# Patient Record
Sex: MALE | Race: WHITE | NOT HISPANIC OR LATINO | Employment: UNEMPLOYED | URBAN - METROPOLITAN AREA
[De-identification: names, ages, dates, MRNs, and addresses within clinical notes are randomized per-mention and may not be internally consistent; named-entity substitution may affect disease eponyms.]

---

## 2020-01-07 ENCOUNTER — TELEPHONE (OUTPATIENT)
Dept: PHARMACY | Facility: CLINIC | Age: 30
End: 2020-01-07

## 2020-01-07 NOTE — TELEPHONE ENCOUNTER
No answer/VM to inform patient that [facility] received prescription for Humira CF and prior authorization is required.  [facility] will be back in touch once insurance determination is received.

## 2020-01-15 NOTE — TELEPHONE ENCOUNTER
Called patient to let him know PA was denied because his insurance company requires negative HBV and TB tests within last 30 days and his last labs were 5/16/2019. Patient verbalized understanding and will call provider to schedule appt. Messaged provider as well to order repeat labs.     Francoise Weaver, Pharm.D.  Pharmacy Resident, PGY-1   [facility]

## 2020-02-27 PROBLEM — K50.119 CROHN'S DISEASE OF COLON WITH COMPLICATION: Status: ACTIVE | Noted: 2020-02-27

## 2020-03-11 NOTE — TELEPHONE ENCOUNTER
DOCUMENTATION ONLY:     Prior Authorization for Humira CF APPROVED from 3/4/20 to 3/4/21.      Case ID# [id number]    Co-Pay: $0.00    Patient Assistance IS NOT required.     Forward to clinical pharmacist for consult & shipment.      YOLANDA

## 2020-03-11 NOTE — TELEPHONE ENCOUNTER
Call attempt regarding Humira consult and shipment from [facility]. Unable to LVM. Refill too soon until 3/29. Will continue to try to reach patient to offer [facility] services.

## 2020-03-16 ENCOUNTER — PATIENT MESSAGE (OUTPATIENT)
Dept: PHARMACY | Facility: CLINIC | Age: 30
End: 2020-03-16

## 2020-03-16 ENCOUNTER — TELEPHONE (OUTPATIENT)
Dept: PHARMACY | Facility: CLINIC | Age: 30
End: 2020-03-16

## 2020-03-16 NOTE — TELEPHONE ENCOUNTER
Attempted to call patient on 3/16 regarding Humira CF initial consult and fill, unable to LVM. Review if patient wishes to fill with [facility] since previous therigy notes indicate pt filled Humira at another pharmacy - Review if patient filled LD already.

## 2020-03-18 NOTE — TELEPHONE ENCOUNTER
Attempted to call patient on 3/18 regarding Humira CF initial consult and fill, unable to LVM. Review if patient wishes to fill with [facility] since previous therigy notes indicate pt filled Humira at another pharmacy - Review if patient filled LD already. [employer] message sent on 3/16 unread by patient.

## 2020-03-30 ENCOUNTER — PATIENT MESSAGE (OUTPATIENT)
Dept: PHARMACY | Facility: CLINIC | Age: 30
End: 2020-03-30

## 2021-05-12 ENCOUNTER — PATIENT MESSAGE (OUTPATIENT)
Dept: RESEARCH | Facility: HOSPITAL | Age: 31
End: 2021-05-12

## 2022-05-24 PROBLEM — F32.0 CURRENT MILD EPISODE OF MAJOR DEPRESSIVE DISORDER: Status: ACTIVE | Noted: 2022-05-24

## 2022-05-24 PROBLEM — G47.00 INSOMNIA: Status: ACTIVE | Noted: 2022-05-24

## 2022-05-24 PROBLEM — E11.9 DIABETES MELLITUS, TYPE 2: Status: ACTIVE | Noted: 2022-05-24

## 2022-05-24 PROBLEM — F33.0 MILD EPISODE OF RECURRENT MAJOR DEPRESSIVE DISORDER: Status: ACTIVE | Noted: 2022-05-24

## 2022-05-24 PROBLEM — Z79.4 TYPE 2 DIABETES MELLITUS, WITH LONG-TERM CURRENT USE OF INSULIN: Status: ACTIVE | Noted: 2022-05-24

## 2022-05-24 PROBLEM — F43.10 PTSD (POST-TRAUMATIC STRESS DISORDER): Status: ACTIVE | Noted: 2022-05-24

## 2022-05-24 PROBLEM — F32.0 CURRENT MILD EPISODE OF MAJOR DEPRESSIVE DISORDER: Status: RESOLVED | Noted: 2022-05-24 | Resolved: 2022-05-24

## 2023-01-09 PROBLEM — G47.09 OTHER INSOMNIA: Status: ACTIVE | Noted: 2022-05-24

## 2023-02-23 PROBLEM — G47.20 CIRCADIAN RHYTHM SLEEP DISORDER: Status: ACTIVE | Noted: 2023-02-23

## 2023-02-23 PROBLEM — G47.30 SLEEP-DISORDERED BREATHING: Status: ACTIVE | Noted: 2023-02-23

## 2023-03-06 PROBLEM — F41.1 GAD (GENERALIZED ANXIETY DISORDER): Status: ACTIVE | Noted: 2023-03-06
